# Patient Record
Sex: MALE | Race: WHITE | Employment: OTHER | URBAN - METROPOLITAN AREA
[De-identification: names, ages, dates, MRNs, and addresses within clinical notes are randomized per-mention and may not be internally consistent; named-entity substitution may affect disease eponyms.]

---

## 2017-03-15 PROBLEM — F84.9 UNSPECIFIED PERVASIVE DEVELOPMENTAL DISORDER, CURRENT OR ACTIVE STATE: Status: ACTIVE | Noted: 2017-03-15

## 2017-03-15 PROBLEM — R26.89 POOR BALANCE: Status: ACTIVE | Noted: 2017-03-15

## 2017-05-30 PROBLEM — N62 GYNECOMASTIA, MALE: Status: ACTIVE | Noted: 2017-05-30

## 2017-06-07 ENCOUNTER — HOSPITAL ENCOUNTER (OUTPATIENT)
Dept: WOMENS IMAGING | Age: 47
Discharge: HOME | End: 2017-06-07
Payer: MEDICARE

## 2017-06-07 DIAGNOSIS — N62 GYNECOMASTIA, MALE: ICD-10-CM

## 2017-06-07 PROCEDURE — 76641 ULTRASOUND BREAST COMPLETE: CPT

## 2017-10-02 PROBLEM — R05.3 CHRONIC COUGH: Status: ACTIVE | Noted: 2017-10-02

## 2019-03-07 ENCOUNTER — HOSPITAL ENCOUNTER (EMERGENCY)
Age: 49
Discharge: HOME | End: 2019-03-07
Attending: EMERGENCY MEDICINE
Payer: MEDICARE

## 2019-03-07 VITALS
WEIGHT: 105 LBS | TEMPERATURE: 98.2 F | BODY MASS INDEX: 19.2 KG/M2 | HEART RATE: 110 BPM | DIASTOLIC BLOOD PRESSURE: 81 MMHG | RESPIRATION RATE: 16 BRPM | OXYGEN SATURATION: 97 % | SYSTOLIC BLOOD PRESSURE: 160 MMHG

## 2019-03-07 DIAGNOSIS — L03.211 CELLULITIS, FACE: Primary | ICD-10-CM

## 2019-03-07 PROCEDURE — 99282 EMERGENCY DEPT VISIT SF MDM: CPT

## 2019-03-07 RX ORDER — CLOTRIMAZOLE AND BETAMETHASONE DIPROPIONATE 10; .64 MG/G; MG/G
CREAM TOPICAL
Qty: 30 G | Refills: 0 | Status: SHIPPED | OUTPATIENT
Start: 2019-03-07 | End: 2019-05-30 | Stop reason: SDUPTHER

## 2019-03-07 RX ORDER — CEPHALEXIN 250 MG/5ML
250 POWDER, FOR SUSPENSION ORAL 2 TIMES DAILY
Qty: 100 ML | Refills: 0 | Status: SHIPPED | OUTPATIENT
Start: 2019-03-07 | End: 2019-03-17

## 2021-11-07 ENCOUNTER — HOSPITAL ENCOUNTER (EMERGENCY)
Age: 51
Discharge: HOME | End: 2021-11-07
Attending: EMERGENCY MEDICINE
Payer: MEDICARE

## 2021-11-07 VITALS
RESPIRATION RATE: 14 BRPM | OXYGEN SATURATION: 98 % | BODY MASS INDEX: 16.48 KG/M2 | TEMPERATURE: 98.2 F | HEART RATE: 96 BPM | WEIGHT: 105 LBS | HEIGHT: 67 IN

## 2021-11-07 DIAGNOSIS — S00.561A INSECT BITE OF LIP, INITIAL ENCOUNTER: Primary | ICD-10-CM

## 2021-11-07 DIAGNOSIS — R60.0 LOCALIZED EDEMA: ICD-10-CM
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
## 2021-11-07 PROCEDURE — 6370000000 HC RX 637 (ALT 250 FOR IP): Performed by: PHYSICIAN ASSISTANT

## 2021-11-07 PROCEDURE — 96372 THER/PROPH/DIAG INJ SC/IM: CPT

## 2021-11-07 PROCEDURE — 6360000002 HC RX W HCPCS: Performed by: PHYSICIAN ASSISTANT

## 2021-11-07 PROCEDURE — 99285 EMERGENCY DEPT VISIT HI MDM: CPT

## 2021-11-07 RX ORDER — KETOROLAC TROMETHAMINE 30 MG/ML
30 INJECTION, SOLUTION INTRAMUSCULAR; INTRAVENOUS ONCE
Status: COMPLETED | OUTPATIENT
Start: 2021-11-07 | End: 2021-11-07

## 2021-11-07 RX ORDER — PREDNISONE 20 MG/1
40 TABLET ORAL DAILY
Qty: 6 TABLET | Refills: 0 | Status: SHIPPED | OUTPATIENT
Start: 2021-11-07 | End: 2021-11-10

## 2021-11-07 RX ORDER — KETOROLAC TROMETHAMINE 30 MG/ML
30 INJECTION, SOLUTION INTRAMUSCULAR; INTRAVENOUS ONCE
Status: DISCONTINUED | OUTPATIENT
Start: 2021-11-07 | End: 2021-11-07

## 2021-11-07 RX ORDER — FAMOTIDINE 20 MG/1
20 TABLET, FILM COATED ORAL ONCE
Status: COMPLETED | OUTPATIENT
Start: 2021-11-07 | End: 2021-11-07

## 2021-11-07 RX ORDER — FAMOTIDINE 40 MG/5ML
40 POWDER, FOR SUSPENSION ORAL 2 TIMES DAILY
Status: DISCONTINUED | OUTPATIENT
Start: 2021-11-07 | End: 2021-11-07

## 2021-11-07 RX ORDER — METHYLPREDNISOLONE SODIUM SUCCINATE 125 MG/2ML
125 INJECTION, POWDER, LYOPHILIZED, FOR SOLUTION INTRAMUSCULAR; INTRAVENOUS ONCE
Status: COMPLETED | OUTPATIENT
Start: 2021-11-07 | End: 2021-11-07

## 2021-11-07 RX ORDER — DIPHENHYDRAMINE HCL 12.5MG/5ML
50 LIQUID (ML) ORAL ONCE
Status: COMPLETED | OUTPATIENT
Start: 2021-11-07 | End: 2021-11-07

## 2021-11-07 RX ADMIN — METHYLPREDNISOLONE SODIUM SUCCINATE 125 MG: 125 INJECTION, POWDER, FOR SOLUTION INTRAMUSCULAR; INTRAVENOUS at 16:58

## 2021-11-07 RX ADMIN — KETOROLAC TROMETHAMINE 30 MG: 30 INJECTION, SOLUTION INTRAMUSCULAR at 17:00

## 2021-11-07 RX ADMIN — FAMOTIDINE 20 MG: 20 TABLET, FILM COATED ORAL at 17:56

## 2021-11-07 RX ADMIN — DIPHENHYDRAMINE HYDROCHLORIDE 50 MG: 25 SOLUTION ORAL at 16:57

## 2021-11-07 NOTE — ED PROVIDER NOTES
[address] [facility]  [address] [address] [facility] [place] [address]  Phone: [phone]  Fax: [phone] [facility]      Pt Name: Raymond Gunn  MRN: [id number]  [place] 1970  Date of evaluation: 11/7/2021  Provider: Audrey Chen PA-C    CHIEF COMPLAINT       Chief Complaint   Patient presents with    Insect Bite     bee sting around lip today about 30 min ago           HISTORY OF PRESENT ILLNESS  (Location/Symptom, Timing/Onset, Context/Setting, Quality, Duration, Modifying Factors, Severity.)   Raymond Gunn is a 46 y.o. male who presents to the emergency department for evaluation of swollen lower lip after getting by a bee while sitting in a car. No resp issues/chest pain or drooling. No previous anaphylactic history reported. This occurred about 30 min prior to arrival.  They came directly here. Hx mental retardation. Nursing Notes were reviewed. REVIEW OF SYSTEMS    (2-9 systems for level 4, 10 or more for level 5)     Review of Systems   Constitutional: Negative. HENT: Negative. Eyes: Negative. Respiratory: Negative. Cardiovascular: Negative. Gastrointestinal: Negative. Musculoskeletal: Negative. Endocrine: Negative. Genitourinary: Negative. Skin: Negative. Allergic/Immunologic: Negative. Neurological: Negative. Hematological: Negative. Psychiatric/Behavioral: Negative. Except as noted above the remainder of the review of systems was reviewed and negative. PAST MEDICAL HISTORY   History reviewed. Past Medical History:   Diagnosis Date    Anxiety     Asthmatic bronchitis     Bursitis          SURGICAL HISTORY     History reviewed. History reviewed. No pertinent surgical history. CURRENT MEDICATIONS       Discharge Medication List as of 11/7/2021  6:24 PM      CONTINUE these medications which have NOT CHANGED    Details   pimecrolimus (ELIDEL) 1 % cream Apply topically 2 times daily. , Disp-60 g, R-11, Normal breath sounds normal. No wheezes/rales/rhonchi. Musculoskeletal: Normal to inspection. Neurological: Alert, age appropriate mentation and interaction. Skin: Skin is warm and dry. No rash noted. Psychiatric:  Mental retardation but communicative/calm. No distress or agitation. I have seen this pt many times before, no change from baseline mentation during my experiences with him. DIAGNOSTIC RESULTS     EKG: All EKG's are interpreted by the Emergency Department Physician who either signs or Co-signs this chart in the absence of a cardiologist.    Not indicated OR per attending note    RADIOLOGY:   Non-plain film images such as CT, Ultrasound and MRI are read by the radiologist. Plain radiographic images are visualized and preliminarily interpreted by the emergency physician with the below findings:      Interpretation per the Radiologist below, if available at the time of this note:    No orders to display           LABS:  Labs Reviewed - No data to display      All other labs were within normal range or not returned as of this dictation. EMERGENCY DEPARTMENT COURSE and DIFFERENTIAL DIAGNOSIS/MDM:   Vitals:    Vitals:    11/07/21 1618 11/07/21 1759   Pulse: 97 96   Resp: 12 14   Temp: 98.2 °F (36.8 °C)    TempSrc: Temporal    SpO2: 98% 98%   Weight: 47.6 kg (105 lb)    Height: 5' 7\" (1.702 m)      1750  Solumedrol/Pepcid/Benadryl ordered. AVSS. LCTA. No distress or airway issues. Will monitor. 1824  Pt very stable, no worsening of symptoms. Discharging home with Prednisone and recommended OTC antihistamines. Return to ED for worsening symptoms/airway issues/throat edema. I have reviewed the disposition diagnosis with the patient and or their family/guardian. I have answered their questions and given discharge instructions. They voiced understanding of these instructions and did not have any further questions or complaints.       CONSULTS:  None    PROCEDURES:  None    Patient instructed to return to the emergency room if symptoms worsen, return, or any other concern right away which is agreed. FINAL IMPRESSION      1. Insect bite of lip, initial encounter    2. Localized edema            DISPOSITION/PLAN   DISPOSITION Decision To Discharge    CONDITION:  Stable    PATIENT REFERRED TO:  Pritesh Bradley, [address]  [address]  [address]  [phone]    Schedule an appointment as soon as possible for a visit in 2 days  for re-evaluation of your symptoms    [facility]  [address] [place] [address] [phone]  Go to   for worsening of symptoms    [facility]  [address]   [address] [place] [address]  [phone]  Go to   for worsening of symptoms, wheezing/shortness of breath or throat swelling      DISCHARGE MEDICATIONS:  Discharge Medication List as of 11/7/2021  6:24 PM      START taking these medications    Details   predniSONE (DELTASONE) 20 MG tablet Take 2 tablets by mouth daily for 3 doses, Disp-6 tablet, R-0Normal             (Please note that portions of this note were completed with a voice recognition program.  Efforts were made to edit the dictations but occasionally words are mis-transcribed.)    BROWN Barajas PA-C  11/09/21 5575

## 2024-04-12 ENCOUNTER — OFFICE VISIT (OUTPATIENT)
Dept: ORTHOPEDIC SURGERY | Age: 54
End: 2024-04-12

## 2024-04-12 DIAGNOSIS — M25.561 RIGHT KNEE PAIN, UNSPECIFIED CHRONICITY: ICD-10-CM

## 2024-04-12 DIAGNOSIS — M79.605 PAIN OF LEFT LOWER EXTREMITY: Primary | ICD-10-CM

## 2024-04-12 DIAGNOSIS — M25.551 PAIN OF RIGHT HIP: ICD-10-CM

## 2024-04-12 NOTE — PROGRESS NOTES
limitations can certainly manifest itself.  Apparently he does place sometimes in a special needs a baseball games with the [employer] and is able to compete with this but has been having increased difficulty with this.  As such I think would be a trial of ibuprofen suspension would be indicated as he is not able to take pills.  Prescription was sent in for this the parents were happy to hear that the was no obvious orthopedic abnormality.  Back here on appearing basis    Provider Attestation:  I, Aden Hatfield, personally performed the services described in this documentation. All medical record entries made by the scribe were at my direction and in my presence. I have reviewed the chart and discharge instructions and agree that the records reflect my personal performance and is accurate and complete. Aden Hatfield MD. 04/12/24      Please note that this chart was generated using voice recognition Dragon dictation software.  Although every effort was made to ensure the accuracy of this automated transcription, some errors in transcription may have occurred.

## 2024-05-13 ENCOUNTER — HOSPITAL ENCOUNTER (EMERGENCY)
Age: 54
Discharge: HOME | End: 2024-05-13
Attending: EMERGENCY MEDICINE
Payer: MEDICARE

## 2024-05-13 VITALS
HEART RATE: 97 BPM | TEMPERATURE: 97.9 F | DIASTOLIC BLOOD PRESSURE: 90 MMHG | SYSTOLIC BLOOD PRESSURE: 146 MMHG | OXYGEN SATURATION: 99 % | HEIGHT: 67 IN | WEIGHT: 105.82 LBS | BODY MASS INDEX: 16.61 KG/M2 | RESPIRATION RATE: 18 BRPM

## 2024-05-13 DIAGNOSIS — R21 RASH AND OTHER NONSPECIFIC SKIN ERUPTION: Primary | ICD-10-CM

## 2024-05-13 PROCEDURE — 99283 EMERGENCY DEPT VISIT LOW MDM: CPT

## 2024-05-13 RX ORDER — BENZOCAINE/MENTHOL 6 MG-10 MG
LOZENGE MUCOUS MEMBRANE
Qty: 1 EACH | Refills: 0 | Status: SHIPPED | OUTPATIENT
Start: 2024-05-13 | End: 2024-05-20

## 2024-05-13 RX ORDER — KETOCONAZOLE 20 MG/G
CREAM TOPICAL
Qty: 30 G | Refills: 1 | Status: SHIPPED | OUTPATIENT
Start: 2024-05-13

## 2024-05-13 ASSESSMENT — PAIN - FUNCTIONAL ASSESSMENT: PAIN_FUNCTIONAL_ASSESSMENT: NONE - DENIES PAIN

## 2024-05-13 NOTE — ED PROVIDER NOTES
[facility] EMERGENCY DEPARTMENT  eMERGENCY dEPARTMENT eNCOUnter      Pt Name: Herbert Wilson  MRN: [id number]  Birthdate 1970  Date of evaluation: 5/13/2024  Provider: Nav Arce PA-C    CHIEF COMPLAINT       Chief Complaint   Patient presents with    Skin Problem     Curious, itchy, red, raised bumps on right arm.  Noticed today.  Bumps dont appear anywhere else.  Family bathed pt prior to coming to ER and didn't find any more bumps.            HISTORY OF PRESENT ILLNESS  (Location/Symptom, Timing/Onset, Context/Setting, Quality, Duration, Modifying Factors, Severity.)   Herbert Wilson is a 53 y.o. male who presents to the emergency department with father c/o rash to right forearm. States rash started yesterday.  Father states he thinks he might have been bit by something, they were at a baseball game.  Patient also is mentally challenged history obtained from father.. Denies any trouble breathing or swallowing. Denies any abd pain, cp, sob, n/v/d/c. Denies any fevers.    Patient has been itching    Quality: itchy  Duration: constant  Modifying Factors: none  Severity: mild    Nursing Notes were reviewed.    REVIEW OF SYSTEMS    (2-9 systems for level 4, 10 or more for level 5)     Review of Systems   C/o rash  Denies trouble breathing  Denies cp  Denies fevers.     Except as noted above the remainder of the review of systems was reviewed and negative.       PAST MEDICAL HISTORY     Past Medical History:   Diagnosis Date    Anxiety     Asthmatic bronchitis     Bursitis      None otherwise stated in nurses notes    SURGICAL HISTORY     No past surgical history on file.  None otherwise stated in nurses notes    CURRENT MEDICATIONS       Previous Medications    CLONAZEPAM (KLONOPIN) 0.5 MG DISINTEGRATING TABLET    DISSOLVE 1 TABLET IN MOUTH THREE TIMES DAILY AS NEEDED FOR ANXIETY    CLOTRIMAZOLE-BETAMETHASONE (LOTRISONE) 1-0.05 % CREAM    Plan twice a day to rash    HANDICAP PLACARD [facility]

## 2024-05-13 NOTE — ED PROVIDER NOTES
Emergency Department     Faculty Attestation    I performed a history and physical examination of the patient and discussed management with the mid level provider. I reviewed the mid level provider's note and agree with the documented findings and plan of care. Any areas of disagreement are noted on the chart. I was personally present for the key portions of any procedures. I have documented in the chart those procedures where I was not present during the key portions. I have reviewed the emergency nurses triage note. I agree with the chief complaint, past medical history, past surgical history, allergies, medications, social and family history as documented unless otherwise noted below. Documentation of the HPI, Physical Exam and Medical Decision Making performed by medical students or scribes is based on my personal performance of the HPI, PE and MDM. For Physician Assistant/ Nurse Practitioner cases/documentation I have personally evaluated this patient and have completed at least one if not all key elements of the E/M (history, physical exam, and MDM). Additional findings are as noted.      Primary Care Physician:  Mone Lynn MD    CHIEF COMPLAINT       Chief Complaint   Patient presents with    Skin Problem     Curious, itchy, red, raised bumps on right arm.  Noticed today.  Bumps dont appear anywhere else.  Family bathed pt prior to coming to ER and didn't find any more bumps.        RECENT VITALS:   Temp: 97.9 °F (36.6 °C),  Pulse: 97, Respirations: 18, BP: (!) 146/90    LABS:  Labs Reviewed - No data to display      PERTINENT ATTENDING PHYSICIAN COMMENTS:    Patient here with a rash recent 1 rashes been going on for over a month there is dry and crusty right in the antecubital fossa there is also a left forearm just distal to this what appears to be bug bites that are just recent there is no systemic symptoms patient is mentally challenged is unable to give the history himself will treat with

## 2024-08-14 ENCOUNTER — HOSPITAL ENCOUNTER (OUTPATIENT)
Age: 54
Setting detail: THERAPIES SERIES
Discharge: HOME | End: 2024-08-14
Payer: MEDICARE

## 2024-08-14 PROCEDURE — 97110 THERAPEUTIC EXERCISES: CPT

## 2024-08-14 PROCEDURE — 97162 PT EVAL MOD COMPLEX 30 MIN: CPT

## 2024-08-14 NOTE — CONSULTS
to MOD (A)     Feeding [x] Independent  [] Assist [x] Independent  [] Assist    Toileting [x] Independent-  raised seat  [] Assist [] Independent  [x] Assist- MIN (A)    Driving [] Independent  [x] Assist [] Independent  [x] Assist    Housekeeping [] Independent  [x] Assist [] Independent  [x] Assist    Grocery shop/meal prep [] Independent  [x] Assist [] Independent  [x] Assist      Gait Prior level of function Current level of function    [] Independent  [x] Assist [] Independent  [x] Assist   Device: [x] Independent [] Independent    [] Straight Cane [] Quad cane [] Straight Cane [] Quad cane    [] Standard walker [] Rolling walker   [x] 4 wheeled walker [] Standard walker [] Rolling walker   [] 4 wheeled walker    [] Wheelchair [] Wheelchair   Used 4WW with CG (A) previously for short distances in home- up to 15-20', used wheelchair previously for ambulation longer than this.    Currently ambulated min (A) for up to 40' with use of 2WW in clinic.  Uses wheelchair for all other ambulation.     Pain:  [x] Yes  [] No Location: (R) LE; difficulty to tell intensity due to nonverbal Pain Rating: (0-10 scale) NA/10  Pain altered Tx:  [] Yes  [] No  Action:  Symptoms:  [] Improving [] Worsening [] Same  Better:  [] AM    [] PM    [] Sit    [] Rise/Sit    []Stand    [] Walk    [] Lying    [] Other:  Worse: [] AM    [] PM    [] Sit    [] Rise/Sit    []Stand    [] Walk    [] Lying    [] Bend                             [] Valsalva    [] Other:  Sleep: [] OK    [] Disturbed    Objective:  TRANSFERS:    Sit to stand MOD (A)    Currently ambulated min (A) for up to 40' with use of 2WW in clinic.  Uses wheelchair for all other ambulation.    DID NOT TEST STAIRS IN CLINIC- MOD (A) 2 stairs for home per parents    TUG testing 39 seconds with use of 2WW with verbal cueing from therapist    5X sit to stand 50 seconds with verbal cueing from therapist.    Hamstrings lack 30-40 degrees with passive mobility from sitting testing in

## 2024-08-20 ENCOUNTER — HOSPITAL ENCOUNTER (OUTPATIENT)
Age: 54
Setting detail: THERAPIES SERIES
Discharge: HOME | End: 2024-08-20
Payer: MEDICARE

## 2024-08-20 PROCEDURE — 97530 THERAPEUTIC ACTIVITIES: CPT

## 2024-08-20 PROCEDURE — 97110 THERAPEUTIC EXERCISES: CPT

## 2024-08-20 PROCEDURE — 97112 NEUROMUSCULAR REEDUCATION: CPT

## 2024-08-20 NOTE — FLOWSHEET NOTE
[facility]  [facility]  [address]  [facility] [address]  [phone]  F: [phone]     Physical Therapy Daily Treatment Note    Date:  2024  Patient Name:  Herbert Wilson    :  1970  MRN: [id number]  Physician: Mone Lynn MD                                     Insurance: MEDICARE/MEDICAID;  NO VISIT LIMIT/FOLLOWS MDCR GUIDELINES   Medical Diagnosis: Right leg weakness (R29.898)  Pervasive developmental disorder (F84.9)  Poor balance (R26.89)                         Rehab Codes: (R) LE weakness R29.898  Onset date: referral 24             Next Dr's appt.: PRN    Visit Count:                                 Cancel/No Show: 0/0    Subjective:    Pain:  [] Yes  [x] No Location: N/A  Pain Rating: (0-10 scale) 0/10  Pain altered Tx:  [x] No  [] Yes  Action:    Comments:  Pt's mother states patient has been walking more frequently.  Main difficulty is turning motions without UE support and unsteadiness.        Objective:  bilateral knee flexion contracture, apprehensive with standing without walker support.         Today’s Treatment:    Modalities:   Precautions:    Exercise Reps/ Time Weight/ Level Comments   LAQ 20  2#  blaze pods used as targets hand-held in front of patient   Heel raise seated 15      Seated marches 20    blaze pods used as targets on floor   Seated reach to blazepod on top of cone  x 15     blaze pods placed anterolateral to patient to facilitate reaching outside of DODIE and engage core    Walk 30' path with rolling walker engage 2 floor and 2 cone blaze pods  X 4   4 same-color pods randomly lighting    4-pod engagement at stall bars  15 hits x 2   At stall bars to encourage single UE hand-hold    Seated hamstring stretch  15\" x 6    Bilaterally      Other: verbal, visual and tactile cues utilized for patient instruction.    Response to treatment:  Some noted fatigue with prolonged ambulation distances.

## 2024-08-23 ENCOUNTER — HOSPITAL ENCOUNTER (OUTPATIENT)
Age: 54
Setting detail: THERAPIES SERIES
Discharge: HOME | End: 2024-08-23
Payer: MEDICARE

## 2024-08-23 PROCEDURE — 97112 NEUROMUSCULAR REEDUCATION: CPT

## 2024-08-23 NOTE — FLOWSHEET NOTE
[facility]  [address]  [facility] [address]  [phone]  F: [phone]     Physical Therapy Daily Treatment Note    Date:  2024  Patient Name:  Herbert Wilson    :  1970  MRN: [id number]  Physician: Mone Lynn MD                                     Insurance: MEDICARE/MEDICAID;  NO VISIT LIMIT/FOLLOWS MDCR GUIDELINES   Medical Diagnosis: Right leg weakness (R29.898)  Pervasive developmental disorder (F84.9)  Poor balance (R26.89)                         Rehab Codes: (R) LE weakness R29.898  Onset date: referral 24             Next Dr's appt.: PRN    Visit Count: 3/12                                Cancel/No Show: 0/0    Subjective:    Pain:  [] Yes  [x] No Location: N/A  Pain Rating: (0-10 scale) 0/10  Pain altered Tx:  [x] No  [] Yes  Action:    Comments:  Pt's  family discussed issues with batting/swinging at [employer] field of [employer] program.  Had them bring a bat this date.     Objective:  bilateral knee flexion contracture, apprehensive with standing without walker support.         Today’s Treatment:    Modalities:   Precautions:    Exercise Reps/ Time Weight/ Level Comments   LAQ 20  2#  blaze pods used as targets hand-held in front of patient   Heel raise seated 15      Seated marches 20    blaze pods used as targets on floor   Seated reach to blazepod on top of cone  x 15     blaze pods placed anterolateral to patient to facilitate reaching outside of DODIE and engage core    Walk 30' path with rolling walker engage 2 floor and 2 cone blaze pods  X 4   4 same-color pods randomly lighting    6-pod engagement at back room  15 hits x 2   Cones, low wall X 2, straight wall, floor   Blaze pods seated posture without support/swinging bat 15 x 2      Seated hamstring stretch  15\" x 6    Bilaterally      Other: verbal, visual and tactile cues utilized for patient instruction.    Response to treatment:  Fatigue

## 2024-08-26 ENCOUNTER — HOSPITAL ENCOUNTER (OUTPATIENT)
Age: 54
Setting detail: THERAPIES SERIES
Discharge: HOME | End: 2024-08-26
Payer: MEDICARE

## 2024-08-26 PROCEDURE — 97112 NEUROMUSCULAR REEDUCATION: CPT

## 2024-08-26 NOTE — FLOWSHEET NOTE
[facility]  [facility]  [address]  [facility] [address]  [phone]  F: [phone]     Physical Therapy Daily Treatment Note    Date:  2024  Patient Name:  Herbert Wilson    :  1970  MRN: [id number]  Physician: Mone Lynn MD                                     Insurance: MEDICARE/MEDICAID;  NO VISIT LIMIT/FOLLOWS MDCR GUIDELINES   Medical Diagnosis: Right leg weakness (R29.898)  Pervasive developmental disorder (F84.9)  Poor balance (R26.89)                         Rehab Codes: (R) LE weakness R29.898  Onset date: referral 24             Next Dr's appt.: PRN    Visit Count:                                 Cancel/No Show: 0/0    Subjective:    Pain:  [] Yes  [x] No Location: N/A  Pain Rating: (0-10 scale) 0/10  Pain altered Tx:  [x] No  [] Yes  Action:    Comments:  Pt's father states patient is tired today due to attending a baseball game yesterday.  Per father, pt was tired after his last PT session, no signs of pain or discomfort.     Objective:  bilateral knee flexion contracture, apprehensive with standing without walker support, and seated with one arm rest. Some increased LE dyskinesia noted today, pt slightly more fatigues than previous sessions.        Today’s Treatment:    Modalities: N/A  Precautions:N/A    Exercise Reps/ Time Weight/ Level Comments   LAQ 20  2#  blaze pods used as targets hand-held in front of patient   Heel raise seated 15      Seated march 20    blaze pods used as targets on floor   Seated reach to blazepod on top of cone  x 15     blaze pods placed anterolateral to patient to facilitate reaching outside of DODIE and engage core    Walk 30' path with rolling walker slalom through 2 cones  engage 2 cone blaze pods at each end of path X 4   2 same-color pods randomly lighting    6-pod engagement at back room  15 hits x 2  NP Cones, low wall X 2, straight wall, floor   Blaze pods seated

## 2024-08-28 ENCOUNTER — HOSPITAL ENCOUNTER (OUTPATIENT)
Age: 54
Setting detail: THERAPIES SERIES
Discharge: HOME | End: 2024-08-28
Payer: MEDICARE

## 2024-08-28 NOTE — FLOWSHEET NOTE
[facility]  Therapy  [address]  [place] [address]  [phone]  F: [phone]     Physical Therapy Cancel/No Show note    Date: 2024  Patient: Herbert Wilson  : 1970  MRN: [id number]    Cancels/No Shows to date:     For today's appointment patient:    [x]  Cancelled    [] Rescheduled appointment    [] No-show     Reason given by patient:    [x]  Patient ill    []  Conflicting appointment    [] No transportation      [] Conflict with work    [] No reason given    [] Weather related    [] COVID-19    [] Other:      Comments:        [x] Next appointment was confirmed    Electronically signed by: Spenser Chicas PTA

## 2024-09-10 ENCOUNTER — HOSPITAL ENCOUNTER (OUTPATIENT)
Age: 54
Setting detail: THERAPIES SERIES
Discharge: HOME | End: 2024-09-10
Payer: MEDICARE

## 2024-09-10 PROCEDURE — 97112 NEUROMUSCULAR REEDUCATION: CPT

## 2024-09-18 ENCOUNTER — HOSPITAL ENCOUNTER (OUTPATIENT)
Age: 54
Setting detail: THERAPIES SERIES
Discharge: HOME | End: 2024-09-18
Payer: MEDICARE

## 2024-09-18 PROCEDURE — 97112 NEUROMUSCULAR REEDUCATION: CPT

## 2024-09-23 ENCOUNTER — HOSPITAL ENCOUNTER (OUTPATIENT)
Age: 54
Setting detail: THERAPIES SERIES
Discharge: HOME | End: 2024-09-23
Payer: MEDICARE

## 2024-09-23 PROCEDURE — 97112 NEUROMUSCULAR REEDUCATION: CPT

## 2024-09-27 ENCOUNTER — HOSPITAL ENCOUNTER (OUTPATIENT)
Facility: CLINIC | Age: 54
Discharge: HOME | End: 2024-09-29
Payer: MEDICARE

## 2024-09-27 ENCOUNTER — HOSPITAL ENCOUNTER (OUTPATIENT)
Dept: GENERAL RADIOLOGY | Facility: CLINIC | Age: 54
Discharge: HOME | End: 2024-09-29
Payer: MEDICARE

## 2024-09-27 DIAGNOSIS — R10.84 GENERALIZED ABDOMINAL PAIN: ICD-10-CM

## 2024-09-27 PROBLEM — Z86.69 HISTORY OF SEIZURE DISORDER: Status: ACTIVE | Noted: 2024-09-27

## 2024-09-27 PROCEDURE — 74018 RADEX ABDOMEN 1 VIEW: CPT

## 2024-09-30 ENCOUNTER — HOSPITAL ENCOUNTER (OUTPATIENT)
Age: 54
Setting detail: THERAPIES SERIES
Discharge: HOME | End: 2024-09-30
Payer: MEDICARE

## 2024-09-30 PROCEDURE — 97112 NEUROMUSCULAR REEDUCATION: CPT

## 2024-09-30 NOTE — RESULT ENCOUNTER NOTE
Call Tamanna - does have a mild amount of stool in the colon  - recommend giving him a 1/2 dose of Miralax daily for 3 days to clean him out. Can mix it in any drink.

## 2024-09-30 NOTE — FLOWSHEET NOTE
hamstring stretch  15\" x 6    Bilaterally    Seated boss toss  X 15  Toy ball Facilitate unsupported sitting   Kick red physioball  Alternate LE's X 10  Facilitate unsupported sitting core strength   Punching bag X 15 NP balloon Facilitate unsupported sitting, toy ball in pillow case place at various heights and distances    6 pod engagement X 2   4 floor pods, two cone pods all illuminated at once, single color. Pt negotiates to each pod and engages each pod until all are unlit. Verbal and tactile cues provided first attempt for target acquisition.       Other: verbal, visual and tactile cues utilized for patient instruction.    Response to treatment:  Pt tolerated session well, fatigues with prolonged or standing activities. Improved performance with unsupported sitting.  Verbal and tactile cues for target acquisition.     Treatment Charges: Mins Units   []  Modalities     []  Ther Exercise     []  Manual Therapy     []  Ther Activities     [x]  Neuro Re-ed 45 3   []  Vasocompression     [] Gait     []  Other     Total billable time 45 3       Assessment: [x] Progressing toward goals.    [] No change.     [] Other:              [x] Patient would continue to benefit from skilled physical therapy services in order to work on mobility, transfers/ambulation, work on hamstring mobility.       STG: (to be met in 8 treatments)  ? Pain: less visible decrease of WB on the (R) grossly  ? ROM: knee extension lacks 20 degrees passively to help with gait pattern  ? Strength: 4+/5 seated knee testing  ? Function: sit to stand transfer with min (A); TUG testing 25 seconds or better, 5x sit to stand testing 35 seconds or better, ambulates up to 100' with MIN (A)  Independent with Home Exercise Programs  Demonstrate Knowledge of fall prevention  LTG: (to be met in 12 treatments)  ? Pain: less visible decrease of WB on the (R) grossly without significant decrease of WB on affected leg  ? ROM: knee extension lacks 10 degrees

## 2024-10-07 ENCOUNTER — HOSPITAL ENCOUNTER (OUTPATIENT)
Age: 54
Setting detail: THERAPIES SERIES
Discharge: HOME | End: 2024-10-07
Payer: MEDICARE

## 2024-10-07 PROCEDURE — 97112 NEUROMUSCULAR REEDUCATION: CPT

## 2024-10-07 NOTE — FLOWSHEET NOTE
on the (R) grossly without significant decrease of WB on affected leg  ? ROM: knee extension lacks 10 degrees passively to help with gait pattern  ? Strength: 4+/5 seated knee testing  ? Function: sit to stand transfer with CG (A); TUG testing 20 seconds or better, 5x sit to stand testing 30 seconds or better, ambulates up to 20' with CG (A) to allow improved tolerance of bathroom ADL  Independent with Home Exercise Programs  Demonstrate Knowledge of fall prevention                    Patient goals: better balance    Pt. Education:  [x] Plans/Goals, Risks/Benefits discussed  [x] Home exercise program    Method of Education: [x] Verbal  [] Demo  [x] Written  Comprehension of Education:  [x] Verbalizes understanding.  [] Demonstrates understanding.  [] Needs Review.  [] Demonstrates/verbalizes understanding of HEP/Ed previously given.     Exercises:given initially  Exercise Reps/ Time Weight/ Level Comments   LAQ 15       Heel raise seated 15       Seated marches 15                 Seated hamstring stretch     Reviewed with parents passively as well      Plan: [x] Continue current frequency toward long and short term goals.                [x] Specific Instructions for subsequent treatments: progress general mobility, strength, transfers       Frequency:  2-3 x/week for 12 visits     Time In: 0845        Time Out: 0930    Electronically signed by:  Spenser Chicas PTA

## 2024-10-16 ENCOUNTER — HOSPITAL ENCOUNTER (OUTPATIENT)
Age: 54
Setting detail: THERAPIES SERIES
Discharge: HOME | End: 2024-10-16
Payer: MEDICARE

## 2024-10-16 PROCEDURE — 97112 NEUROMUSCULAR REEDUCATION: CPT

## 2024-10-16 NOTE — FLOWSHEET NOTE
[facility] &  Therapy  [address], [address]  [place] [address]  [phone]  F: [phone]     Physical Therapy Daily Treatment Note/PROGRESS NOTE    Date:  10/16/2024  Patient Name:  Herbert Wilson    :  1970  MRN: [id number]  Physician: Mone Lynn MD                                     Insurance: MEDICARE/MEDICAID;  NO VISIT LIMIT/FOLLOWS MDCR GUIDELINES   Medical Diagnosis: Right leg weakness (R29.898)  Pervasive developmental disorder (F84.9)  Poor balance (R26.89)                         Rehab Codes: (R) LE weakness R29.898  Onset date: referral 24             Next Dr's appt.: PRN    Visit Count: 10/18                                Cancel/No Show: 1/0  Reporting period 24 to 10/16/24    Subjective:    Pain:  [] Yes  [x] No Location: N/A  Pain Rating: (0-10 scale) 0/10 patient non-verbal, unable to express pain level, no noted signs or sx of pain/discomfort  Pain altered Tx:  [x] No  [] Yes  Action:    Comments:  No new complaints.  No significant pain at this time.  Family reporting improved overall tolerance of ambulation and more confidence in this regard.  Is making progress grossly overall with sit to stand transfers and doing this (I) at this time.  Still limited with full tolerance of gait for longer distances greater than about ' and uses wheelchair; limited with hamstring mobility with knee flexion contracture at this time.    Objective: noted improvement in decision making and task orientation without need for v/cues. `    TRANSFERS:   CG (A)    Currently ambulated CG (A) for up to 75'-100' with use of 2WW in clinic.  Uses wheelchair for all other ambulation.     DID NOT TEST STAIRS IN CLINIC- MOD (A) 2 stairs for home per parents     TUG testing 39 seconds initially, 23 seconds 10/16/24;  with use of 2WW with verbal cueing from therapist     5X sit to stand 50 seconds initially, 35 seconds 10/16/24 with verbal

## 2024-10-22 NOTE — FLOWSHEET NOTE
[facility] &  Therapy  [address], [address]  [place] [address]  [phone]  F: [phone]     Physical Therapy Daily Treatment Note    Date:  10/22/2024  Patient Name:  Herbert Wilson    :  1970  MRN: [id number]  Physician: Mone Lynn MD                                     Insurance: MEDICARE/MEDICAID;  NO VISIT LIMIT/FOLLOWS MDCR GUIDELINES   Medical Diagnosis: Right leg weakness (R29.898)  Pervasive developmental disorder (F84.9)  Poor balance (R26.89)                         Rehab Codes: (R) LE weakness R29.898  Onset date: referral 24             Next Dr's appt.: PRN    Visit Count:                                 Cancel/No Show: 1/0    Subjective:    Pain:  [] Yes  [x] No Location: N/A  Pain Rating: (0-10 scale) 0/10 patient non-verbal, unable to express pain level, no noted signs or sx of pain/discomfort  Pain altered Tx:  [x] No  [] Yes  Action:    Comments:  Pt's mother states things continue to go well, patient walking better, seems more steady, endurance improving.     Objective: improved walker negotiation demonstrated without v/cues.  Pt was able to step and reach outside DODIE with good performance and balance. Improved step-length bilaterally when ambulating with rolling walker.     Today’s Treatment:    Modalities: N/A  Precautions:N/A    Exercise Reps/ Time Weight/ Level Comments   LAQ 20  2#  blaze pods used as targets hand-held in front of patient   Heel raise seated 15      Seated marches 20  2#  blaze pods used as targets on floor   Hip ADD X 20 Toy ball     Hip ABD X 20 Lime band    6-pod engagement at back room  15 hits x 1 NP Cones, low wall X 2, straight wall x 2, floor x 2 single color   Blaze pods seated posture on mat table engaging two pods placed laterally on tall cones 15 x 2   Flex bar in each hand to engage pods.  Unsupported sitting with verbal and tactile cues for targeting and engaging targets

## 2024-10-23 ENCOUNTER — HOSPITAL ENCOUNTER (OUTPATIENT)
Age: 54
Setting detail: THERAPIES SERIES
Discharge: HOME | End: 2024-10-23
Payer: MEDICARE

## 2024-10-23 PROCEDURE — 97112 NEUROMUSCULAR REEDUCATION: CPT

## 2024-10-30 ENCOUNTER — HOSPITAL ENCOUNTER (OUTPATIENT)
Age: 54
Setting detail: THERAPIES SERIES
Discharge: HOME | End: 2024-10-30
Payer: MEDICARE

## 2024-10-30 NOTE — FLOWSHEET NOTE
[facility] &  Therapy  [address], [address]  [place] [address]  [phone]  F: [phone]     Physical Therapy Daily Treatment Note/CXL/NS    Date:  10/30/2024  Patient Name:  Herbert Wilson    :  1970  MRN: [id number]  Physician: Mone Lynn MD                                     Insurance: MEDICARE/MEDICAID;  NO VISIT LIMIT/FOLLOWS MDCR GUIDELINES   Medical Diagnosis: Right leg weakness (R29.898)  Pervasive developmental disorder (F84.9)  Poor balance (R26.89)                         Rehab Codes: (R) LE weakness R29.898  Onset date: referral 24             Next Dr's appt.: PRN    Visit Count:                                 Cancel/No Show: 2/0    Cancelled appointment today- came for initial therapy session about 10 minutes late and was unmotivated/refused to do exercises.  Will intermittently go through cycle of being motivated to do exercises at home and being unmotivated/disengaged per mother (caretaker).  Has been better overall generally with ambulation tolerance, balance during gait, better with hamstring mobility.  See PN 10/16/24 for more objective information.  Patient chart on hold for right now- patient to determine whether her wants to do any additional therapy.    Electronically signed by:  Nawaf Kamara, PT

## 2025-02-03 ENCOUNTER — HOSPITAL ENCOUNTER (OUTPATIENT)
Age: 55
Setting detail: SPECIMEN
Discharge: HOME | End: 2025-02-03

## 2025-02-03 DIAGNOSIS — R30.0 DYSURIA: ICD-10-CM

## 2025-02-03 LAB
BACTERIA URNS QL MICRO: NORMAL
BILIRUB UR QL STRIP: NEGATIVE
CASTS #/AREA URNS LPF: NORMAL /LPF (ref 0–8)
CLARITY UR: ABNORMAL
COLOR UR: YELLOW
EPI CELLS #/AREA URNS HPF: NORMAL /HPF (ref 0–5)
GLUCOSE UR STRIP-MCNC: NEGATIVE MG/DL
HGB UR QL STRIP.AUTO: NEGATIVE
KETONES UR STRIP-MCNC: NEGATIVE MG/DL
LEUKOCYTE ESTERASE UR QL STRIP: NEGATIVE
NITRITE UR QL STRIP: NEGATIVE
PH UR STRIP: 7.5 [PH] (ref 5–8)
PROT UR STRIP-MCNC: NEGATIVE MG/DL
RBC #/AREA URNS HPF: NORMAL /HPF (ref 0–4)
SP GR UR STRIP: 1.01 (ref 1–1.03)
UROBILINOGEN UR STRIP-ACNC: NORMAL EU/DL (ref 0–1)
WBC #/AREA URNS HPF: NORMAL /HPF (ref 0–5)